# Patient Record
Sex: MALE | Race: WHITE | NOT HISPANIC OR LATINO | Employment: OTHER | URBAN - METROPOLITAN AREA
[De-identification: names, ages, dates, MRNs, and addresses within clinical notes are randomized per-mention and may not be internally consistent; named-entity substitution may affect disease eponyms.]

---

## 2020-12-04 ENCOUNTER — TELEPHONE (OUTPATIENT)
Dept: FAMILY MEDICINE | Facility: CLINIC | Age: 60
End: 2020-12-04

## 2020-12-04 NOTE — TELEPHONE ENCOUNTER
zanaflex 4 1 po bid # 60/zero , fiorcet 1 po q 6 hr prn # 30 /zero. Called to [facility] [phone].

## 2020-12-18 ENCOUNTER — APPOINTMENT (OUTPATIENT)
Dept: URGENT CARE | Facility: CLINIC | Age: 60
End: 2020-12-18
Payer: COMMERCIAL

## 2020-12-18 ENCOUNTER — TELEPHONE (OUTPATIENT)
Dept: FAMILY MEDICINE | Facility: CLINIC | Age: 60
End: 2020-12-18

## 2020-12-18 DIAGNOSIS — R05.9 COUGH: ICD-10-CM

## 2020-12-18 PROCEDURE — U0003 INFECTIOUS AGENT DETECTION BY NUCLEIC ACID (DNA OR RNA); SEVERE ACUTE RESPIRATORY SYNDROME CORONAVIRUS 2 (SARS-COV-2) (CORONAVIRUS DISEASE [COVID-19]), AMPLIFIED PROBE TECHNIQUE, MAKING USE OF HIGH THROUGHPUT TECHNOLOGIES AS DESCRIBED BY CMS-2020-01-R: HCPCS

## 2020-12-20 DIAGNOSIS — U07.1 COVID-19 VIRUS DETECTED: ICD-10-CM

## 2020-12-20 LAB — SARS-COV-2 RNA RESP QL NAA+PROBE: DETECTED

## 2021-01-07 ENCOUNTER — TELEPHONE (OUTPATIENT)
Dept: FAMILY MEDICINE | Facility: CLINIC | Age: 61
End: 2021-01-07

## 2021-04-29 ENCOUNTER — PATIENT MESSAGE (OUTPATIENT)
Dept: RESEARCH | Facility: HOSPITAL | Age: 61
End: 2021-04-29

## 2021-08-03 ENCOUNTER — TELEPHONE (OUTPATIENT)
Dept: FAMILY MEDICINE | Facility: CLINIC | Age: 61
End: 2021-08-03

## 2022-05-15 RX ORDER — TIZANIDINE 4 MG/1
4 TABLET ORAL 2 TIMES DAILY PRN
Qty: 60 TABLET | Refills: 2 | Status: SHIPPED | OUTPATIENT
Start: 2022-05-15 | End: 2023-04-12

## 2022-11-04 ENCOUNTER — OFFICE VISIT (OUTPATIENT)
Dept: FAMILY MEDICINE | Facility: CLINIC | Age: 62
End: 2022-11-04
Payer: COMMERCIAL

## 2022-11-04 VITALS
HEIGHT: 72 IN | BODY MASS INDEX: 27.9 KG/M2 | TEMPERATURE: 98 F | RESPIRATION RATE: 18 BRPM | WEIGHT: 206 LBS | DIASTOLIC BLOOD PRESSURE: 80 MMHG | SYSTOLIC BLOOD PRESSURE: 136 MMHG | OXYGEN SATURATION: 97 % | HEART RATE: 79 BPM

## 2022-11-04 DIAGNOSIS — Z72.0 TOBACCO USE: ICD-10-CM

## 2022-11-04 DIAGNOSIS — Z12.11 COLON CANCER SCREENING: ICD-10-CM

## 2022-11-04 DIAGNOSIS — M54.50 LEFT LOW BACK PAIN, UNSPECIFIED CHRONICITY, UNSPECIFIED WHETHER SCIATICA PRESENT: ICD-10-CM

## 2022-11-04 DIAGNOSIS — Z12.5 SCREENING PSA (PROSTATE SPECIFIC ANTIGEN): ICD-10-CM

## 2022-11-04 DIAGNOSIS — Z13.220 SCREENING CHOLESTEROL LEVEL: ICD-10-CM

## 2022-11-04 DIAGNOSIS — J40 BRONCHITIS: Primary | ICD-10-CM

## 2022-11-04 PROCEDURE — 99214 PR OFFICE/OUTPT VISIT, EST, LEVL IV, 30-39 MIN: ICD-10-PCS | Mod: S$GLB,,, | Performed by: FAMILY MEDICINE

## 2022-11-04 PROCEDURE — 3008F PR BODY MASS INDEX (BMI) DOCUMENTED: ICD-10-PCS | Mod: CPTII,S$GLB,, | Performed by: FAMILY MEDICINE

## 2022-11-04 PROCEDURE — 1159F MED LIST DOCD IN RCRD: CPT | Mod: CPTII,S$GLB,, | Performed by: FAMILY MEDICINE

## 2022-11-04 PROCEDURE — 3079F PR MOST RECENT DIASTOLIC BLOOD PRESSURE 80-89 MM HG: ICD-10-PCS | Mod: CPTII,S$GLB,, | Performed by: FAMILY MEDICINE

## 2022-11-04 PROCEDURE — 1160F RVW MEDS BY RX/DR IN RCRD: CPT | Mod: CPTII,S$GLB,, | Performed by: FAMILY MEDICINE

## 2022-11-04 PROCEDURE — 3079F DIAST BP 80-89 MM HG: CPT | Mod: CPTII,S$GLB,, | Performed by: FAMILY MEDICINE

## 2022-11-04 PROCEDURE — 1159F PR MEDICATION LIST DOCUMENTED IN MEDICAL RECORD: ICD-10-PCS | Mod: CPTII,S$GLB,, | Performed by: FAMILY MEDICINE

## 2022-11-04 PROCEDURE — 3075F PR MOST RECENT SYSTOLIC BLOOD PRESS GE 130-139MM HG: ICD-10-PCS | Mod: CPTII,S$GLB,, | Performed by: FAMILY MEDICINE

## 2022-11-04 PROCEDURE — 99214 OFFICE O/P EST MOD 30 MIN: CPT | Mod: S$GLB,,, | Performed by: FAMILY MEDICINE

## 2022-11-04 PROCEDURE — 1160F PR REVIEW ALL MEDS BY PRESCRIBER/CLIN PHARMACIST DOCUMENTED: ICD-10-PCS | Mod: CPTII,S$GLB,, | Performed by: FAMILY MEDICINE

## 2022-11-04 PROCEDURE — 3075F SYST BP GE 130 - 139MM HG: CPT | Mod: CPTII,S$GLB,, | Performed by: FAMILY MEDICINE

## 2022-11-04 PROCEDURE — 3008F BODY MASS INDEX DOCD: CPT | Mod: CPTII,S$GLB,, | Performed by: FAMILY MEDICINE

## 2022-11-04 RX ORDER — BUTALBITAL, ACETAMINOPHEN AND CAFFEINE 300; 40; 50 MG/1; MG/1; MG/1
CAPSULE ORAL
COMMUNITY

## 2022-11-04 RX ORDER — OXYCODONE AND ACETAMINOPHEN 10; 325 MG/1; MG/1
TABLET ORAL
COMMUNITY

## 2022-11-06 PROBLEM — Z72.0 TOBACCO USE: Status: ACTIVE | Noted: 2022-11-06

## 2022-11-06 RX ORDER — DOXYCYCLINE 100 MG/1
100 CAPSULE ORAL EVERY 12 HOURS
Qty: 20 CAPSULE | Refills: 0 | Status: SHIPPED | OUTPATIENT
Start: 2022-11-06

## 2022-11-07 NOTE — PROGRESS NOTES
Subjective:       Patient ID: Bryson Abdalla is a 62 y.o. male.    Chief Complaint: Cough and Nasal Congestion    Coughing congestion for 3 weeks.  Increase and decrease symptoms.  Worsening again now.  No fever.  Greenish sputum.  Tobacco use chews tobacco.  Back pain also.  Going for acupuncture.  Numbness down to the left shin.  Probable radiculopathy of the left lower extremity.  Also colon cancer screening is due.  Has been 10 years.  Due for labs also.      Physical examination.  Vital signs noted.  No acute distress.  Neck without bruit.  Chest clear.  Heart regular rate and rhythm.  Deep tender reflexes trace in the lower extremities.  Straight leg raising is negative.  Left hip good range of motion without any pain.      Objective:        Assessment:       1. Bronchitis    2. Tobacco use    3. Left low back pain, unspecified chronicity, unspecified whether sciatica present    4. Colon cancer screening    5. Screening cholesterol level    6. Screening PSA (prostate specific antigen)          Plan:       Bronchitis  -     Comprehensive Metabolic Panel; Future; Expected date: 11/18/2022  -     CBC Auto Differential; Future; Expected date: 11/18/2022    Tobacco use    Left low back pain, unspecified chronicity, unspecified whether sciatica present  -     X-Ray Lumbar Spine AP And Lateral; Future; Expected date: 11/04/2022    Colon cancer screening    Screening cholesterol level  -     Lipid Panel; Future; Expected date: 11/18/2022    Screening PSA (prostate specific antigen)  -     PSA, Screening; Future; Expected date: 11/18/2022    Other orders  -     doxycycline (VIBRAMYCIN) 100 MG Cap; Take 1 capsule (100 mg total) by mouth every 12 (twelve) hours.  Dispense: 20 capsule; Refill: 0      Discontinue tobacco use.  Go for colonoscopy.  To Dr. Macario hui.  CBC CMP lipids ordered.  PSA.  Discussed ongoing prostate cancer screening wishes to continue with this.  X-ray lumbar spine.  Vibramycin 100 mg b.i.d. for 10  days.

## 2022-11-08 ENCOUNTER — PATIENT MESSAGE (OUTPATIENT)
Dept: ADMINISTRATIVE | Facility: HOSPITAL | Age: 62
End: 2022-11-08
Payer: COMMERCIAL

## 2022-11-21 ENCOUNTER — HOSPITAL ENCOUNTER (OUTPATIENT)
Dept: RADIOLOGY | Facility: HOSPITAL | Age: 62
Discharge: HOME | End: 2022-11-21
Attending: FAMILY MEDICINE
Payer: COMMERCIAL

## 2022-11-21 ENCOUNTER — LAB VISIT (OUTPATIENT)
Dept: LAB | Facility: HOSPITAL | Age: 62
End: 2022-11-21
Attending: FAMILY MEDICINE
Payer: COMMERCIAL

## 2022-11-21 DIAGNOSIS — M54.50 LEFT LOW BACK PAIN, UNSPECIFIED CHRONICITY, UNSPECIFIED WHETHER SCIATICA PRESENT: ICD-10-CM

## 2022-11-21 DIAGNOSIS — Z13.220 SCREENING CHOLESTEROL LEVEL: ICD-10-CM

## 2022-11-21 DIAGNOSIS — J40 BRONCHITIS: ICD-10-CM

## 2022-11-21 DIAGNOSIS — Z12.5 SCREENING PSA (PROSTATE SPECIFIC ANTIGEN): ICD-10-CM

## 2022-11-21 LAB
ALBUMIN SERPL BCP-MCNC: 4.1 G/DL (ref 3.5–5.2)
ALP SERPL-CCNC: 56 U/L (ref 55–135)
ALT SERPL W/O P-5'-P-CCNC: 29 U/L (ref 10–44)
ANION GAP SERPL CALC-SCNC: 7 MMOL/L (ref 8–16)
AST SERPL-CCNC: 23 U/L (ref 10–40)
BASOPHILS # BLD AUTO: 0.03 K/UL (ref 0–0.2)
BASOPHILS NFR BLD: 0.6 % (ref 0–1.9)
BILIRUB SERPL-MCNC: 1.7 MG/DL (ref 0.1–1)
BUN SERPL-MCNC: 14 MG/DL (ref 8–23)
CALCIUM SERPL-MCNC: 9.1 MG/DL (ref 8.7–10.5)
CHLORIDE SERPL-SCNC: 100 MMOL/L (ref 95–110)
CHOLEST SERPL-MCNC: 186 MG/DL (ref 120–199)
CHOLEST/HDLC SERPL: 3.4 {RATIO} (ref 2–5)
CO2 SERPL-SCNC: 28 MMOL/L (ref 23–29)
COMPLEXED PSA SERPL-MCNC: 0.99 NG/ML (ref 0–4)
CREAT SERPL-MCNC: 0.9 MG/DL (ref 0.5–1.4)
DIFFERENTIAL METHOD: ABNORMAL
EOSINOPHIL # BLD AUTO: 0.1 K/UL (ref 0–0.5)
EOSINOPHIL NFR BLD: 1.3 % (ref 0–8)
ERYTHROCYTE [DISTWIDTH] IN BLOOD BY AUTOMATED COUNT: 12.1 % (ref 11.5–14.5)
EST. GFR  (NO RACE VARIABLE): >60 ML/MIN/1.73 M^2
GLUCOSE SERPL-MCNC: 110 MG/DL (ref 70–110)
HCT VFR BLD AUTO: 45.7 % (ref 40–54)
HDLC SERPL-MCNC: 54 MG/DL (ref 40–75)
HDLC SERPL: 29 % (ref 20–50)
HGB BLD-MCNC: 15.5 G/DL (ref 14–18)
IMM GRANULOCYTES # BLD AUTO: 0.01 K/UL (ref 0–0.04)
IMM GRANULOCYTES NFR BLD AUTO: 0.2 % (ref 0–0.5)
LDLC SERPL CALC-MCNC: 119.8 MG/DL (ref 63–159)
LYMPHOCYTES # BLD AUTO: 1.8 K/UL (ref 1–4.8)
LYMPHOCYTES NFR BLD: 34.5 % (ref 18–48)
MCH RBC QN AUTO: 31.9 PG (ref 27–31)
MCHC RBC AUTO-ENTMCNC: 33.9 G/DL (ref 32–36)
MCV RBC AUTO: 94 FL (ref 82–98)
MONOCYTES # BLD AUTO: 0.7 K/UL (ref 0.3–1)
MONOCYTES NFR BLD: 12.5 % (ref 4–15)
NEUTROPHILS # BLD AUTO: 2.7 K/UL (ref 1.8–7.7)
NEUTROPHILS NFR BLD: 50.9 % (ref 38–73)
NONHDLC SERPL-MCNC: 132 MG/DL
NRBC BLD-RTO: 0 /100 WBC
PLATELET # BLD AUTO: 211 K/UL (ref 150–450)
PMV BLD AUTO: 9.5 FL (ref 9.2–12.9)
POTASSIUM SERPL-SCNC: 4.2 MMOL/L (ref 3.5–5.1)
PROT SERPL-MCNC: 7.9 G/DL (ref 6–8.4)
RBC # BLD AUTO: 4.86 M/UL (ref 4.6–6.2)
SODIUM SERPL-SCNC: 135 MMOL/L (ref 136–145)
TRIGL SERPL-MCNC: 61 MG/DL (ref 30–150)
WBC # BLD AUTO: 5.21 K/UL (ref 3.9–12.7)

## 2022-11-21 PROCEDURE — 85025 COMPLETE CBC W/AUTO DIFF WBC: CPT | Performed by: FAMILY MEDICINE

## 2022-11-21 PROCEDURE — 84153 ASSAY OF PSA TOTAL: CPT | Performed by: FAMILY MEDICINE

## 2022-11-21 PROCEDURE — 36415 COLL VENOUS BLD VENIPUNCTURE: CPT | Performed by: FAMILY MEDICINE

## 2022-11-21 PROCEDURE — 80061 LIPID PANEL: CPT | Performed by: FAMILY MEDICINE

## 2022-11-21 PROCEDURE — 72100 X-RAY EXAM L-S SPINE 2/3 VWS: CPT | Mod: TC

## 2022-11-21 PROCEDURE — 80053 COMPREHEN METABOLIC PANEL: CPT | Performed by: FAMILY MEDICINE

## 2022-12-15 LAB — CRC RECOMMENDATION EXT: NORMAL

## 2022-12-22 ENCOUNTER — PATIENT OUTREACH (OUTPATIENT)
Dept: ADMINISTRATIVE | Facility: HOSPITAL | Age: 62
End: 2022-12-22
Payer: COMMERCIAL

## 2024-07-09 ENCOUNTER — PATIENT MESSAGE (OUTPATIENT)
Dept: ADMINISTRATIVE | Facility: HOSPITAL | Age: 64
End: 2024-07-09
Payer: COMMERCIAL

## 2024-09-18 ENCOUNTER — OFFICE VISIT (OUTPATIENT)
Dept: FAMILY MEDICINE | Facility: CLINIC | Age: 64
End: 2024-09-18
Payer: COMMERCIAL

## 2024-09-18 VITALS
SYSTOLIC BLOOD PRESSURE: 132 MMHG | DIASTOLIC BLOOD PRESSURE: 74 MMHG | BODY MASS INDEX: 27.99 KG/M2 | OXYGEN SATURATION: 99 % | HEIGHT: 72 IN | TEMPERATURE: 98 F | WEIGHT: 206.69 LBS | HEART RATE: 62 BPM

## 2024-09-18 DIAGNOSIS — Z00.00 PHYSICAL EXAM: Primary | ICD-10-CM

## 2024-09-18 DIAGNOSIS — Z23 NEED FOR TDAP VACCINATION: ICD-10-CM

## 2024-09-18 DIAGNOSIS — Z98.890 S/P ACL REPAIR: ICD-10-CM

## 2024-09-18 DIAGNOSIS — M50.90 CERVICAL DISC DISEASE: ICD-10-CM

## 2024-09-18 DIAGNOSIS — Z72.0 TOBACCO USE: ICD-10-CM

## 2024-09-18 DIAGNOSIS — Z12.5 SCREENING PSA (PROSTATE SPECIFIC ANTIGEN): ICD-10-CM

## 2024-09-18 PROCEDURE — 99396 PREV VISIT EST AGE 40-64: CPT | Mod: 25,S$GLB,, | Performed by: FAMILY MEDICINE

## 2024-09-18 PROCEDURE — 90715 TDAP VACCINE 7 YRS/> IM: CPT | Mod: S$GLB,,, | Performed by: FAMILY MEDICINE

## 2024-09-18 PROCEDURE — 1160F RVW MEDS BY RX/DR IN RCRD: CPT | Mod: CPTII,S$GLB,, | Performed by: FAMILY MEDICINE

## 2024-09-18 PROCEDURE — 99999 PR PBB SHADOW E&M-EST. PATIENT-LVL III: CPT | Mod: PBBFAC,,, | Performed by: FAMILY MEDICINE

## 2024-09-18 PROCEDURE — 3044F HG A1C LEVEL LT 7.0%: CPT | Mod: CPTII,S$GLB,, | Performed by: FAMILY MEDICINE

## 2024-09-18 PROCEDURE — 3008F BODY MASS INDEX DOCD: CPT | Mod: CPTII,S$GLB,, | Performed by: FAMILY MEDICINE

## 2024-09-18 PROCEDURE — 1159F MED LIST DOCD IN RCRD: CPT | Mod: CPTII,S$GLB,, | Performed by: FAMILY MEDICINE

## 2024-09-18 PROCEDURE — 90471 IMMUNIZATION ADMIN: CPT | Mod: S$GLB,,, | Performed by: FAMILY MEDICINE

## 2024-09-18 PROCEDURE — 3075F SYST BP GE 130 - 139MM HG: CPT | Mod: CPTII,S$GLB,, | Performed by: FAMILY MEDICINE

## 2024-09-18 PROCEDURE — 99213 OFFICE O/P EST LOW 20 MIN: CPT | Mod: 25,S$GLB,, | Performed by: FAMILY MEDICINE

## 2024-09-18 PROCEDURE — 3078F DIAST BP <80 MM HG: CPT | Mod: CPTII,S$GLB,, | Performed by: FAMILY MEDICINE

## 2024-09-18 RX ORDER — TIZANIDINE 4 MG/1
4 TABLET ORAL 2 TIMES DAILY PRN
Qty: 60 TABLET | Refills: 2 | Status: SHIPPED | OUTPATIENT
Start: 2024-09-18

## 2024-09-18 RX ORDER — IBUPROFEN 800 MG/1
800 TABLET ORAL 3 TIMES DAILY
Qty: 30 TABLET | Refills: 0 | Status: SHIPPED | OUTPATIENT
Start: 2024-09-18

## 2024-09-18 RX ORDER — HYDROCODONE BITARTRATE AND ACETAMINOPHEN 5; 325 MG/1; MG/1
1 TABLET ORAL EVERY 6 HOURS PRN
Qty: 28 TABLET | Refills: 0 | Status: SHIPPED | OUTPATIENT
Start: 2024-09-18

## 2024-09-19 ENCOUNTER — LAB VISIT (OUTPATIENT)
Dept: LAB | Facility: HOSPITAL | Age: 64
End: 2024-09-19
Attending: FAMILY MEDICINE
Payer: COMMERCIAL

## 2024-09-19 DIAGNOSIS — Z12.5 SCREENING PSA (PROSTATE SPECIFIC ANTIGEN): ICD-10-CM

## 2024-09-19 DIAGNOSIS — Z00.00 PHYSICAL EXAM: ICD-10-CM

## 2024-09-19 PROBLEM — Z98.890 S/P ACL REPAIR: Status: ACTIVE | Noted: 2024-09-19

## 2024-09-19 LAB
ALBUMIN SERPL BCP-MCNC: 4.6 G/DL (ref 3.5–5.2)
ALP SERPL-CCNC: 49 U/L (ref 55–135)
ALT SERPL W/O P-5'-P-CCNC: 18 U/L (ref 10–44)
ANION GAP SERPL CALC-SCNC: 5 MMOL/L (ref 8–16)
AST SERPL-CCNC: 18 U/L (ref 10–40)
BILIRUB SERPL-MCNC: 1 MG/DL (ref 0.1–1)
BUN SERPL-MCNC: 12 MG/DL (ref 8–23)
CALCIUM SERPL-MCNC: 9.3 MG/DL (ref 8.7–10.5)
CHLORIDE SERPL-SCNC: 104 MMOL/L (ref 95–110)
CHOLEST SERPL-MCNC: 188 MG/DL (ref 120–199)
CHOLEST/HDLC SERPL: 3.2 {RATIO} (ref 2–5)
CO2 SERPL-SCNC: 29 MMOL/L (ref 23–29)
COMPLEXED PSA SERPL-MCNC: 0.34 NG/ML (ref 0–4)
CREAT SERPL-MCNC: 0.8 MG/DL (ref 0.5–1.4)
ERYTHROCYTE [DISTWIDTH] IN BLOOD BY AUTOMATED COUNT: 12.6 % (ref 11.5–14.5)
EST. GFR  (NO RACE VARIABLE): >60 ML/MIN/1.73 M^2
ESTIMATED AVG GLUCOSE: 117 MG/DL (ref 68–131)
GLUCOSE SERPL-MCNC: 97 MG/DL (ref 70–110)
HBA1C MFR BLD: 5.7 % (ref 4.5–6.2)
HCT VFR BLD AUTO: 45.4 % (ref 40–54)
HDLC SERPL-MCNC: 59 MG/DL (ref 40–75)
HDLC SERPL: 31.4 % (ref 20–50)
HGB BLD-MCNC: 15.5 G/DL (ref 14–18)
LDLC SERPL CALC-MCNC: 114.6 MG/DL (ref 63–159)
MCH RBC QN AUTO: 32 PG (ref 27–31)
MCHC RBC AUTO-ENTMCNC: 34.1 G/DL (ref 32–36)
MCV RBC AUTO: 94 FL (ref 82–98)
NONHDLC SERPL-MCNC: 129 MG/DL
PLATELET # BLD AUTO: 200 K/UL (ref 150–450)
PMV BLD AUTO: 10.2 FL (ref 9.2–12.9)
POTASSIUM SERPL-SCNC: 4 MMOL/L (ref 3.5–5.1)
PROT SERPL-MCNC: 7.2 G/DL (ref 6–8.4)
RBC # BLD AUTO: 4.84 M/UL (ref 4.6–6.2)
SODIUM SERPL-SCNC: 138 MMOL/L (ref 136–145)
TRIGL SERPL-MCNC: 72 MG/DL (ref 30–150)
WBC # BLD AUTO: 5 K/UL (ref 3.9–12.7)

## 2024-09-19 PROCEDURE — 85027 COMPLETE CBC AUTOMATED: CPT | Performed by: FAMILY MEDICINE

## 2024-09-19 PROCEDURE — 83036 HEMOGLOBIN GLYCOSYLATED A1C: CPT | Performed by: FAMILY MEDICINE

## 2024-09-19 PROCEDURE — 80053 COMPREHEN METABOLIC PANEL: CPT | Performed by: FAMILY MEDICINE

## 2024-09-19 PROCEDURE — 86803 HEPATITIS C AB TEST: CPT | Performed by: FAMILY MEDICINE

## 2024-09-19 PROCEDURE — 80061 LIPID PANEL: CPT | Performed by: FAMILY MEDICINE

## 2024-09-19 PROCEDURE — 84153 ASSAY OF PSA TOTAL: CPT | Performed by: FAMILY MEDICINE

## 2024-09-19 PROCEDURE — 36415 COLL VENOUS BLD VENIPUNCTURE: CPT | Performed by: FAMILY MEDICINE

## 2024-09-20 LAB — HCV AB S/CO SERPL IA: NON REACTIVE

## 2024-09-20 NOTE — PROGRESS NOTES
Subjective:       Patient ID: Bryson Abdalla is a 64 y.o. male.    Chief Complaint: Annual Exam    Here for routine physical.    Social history chews tobacco.  No significant alcohol in take.  Works as a contractor    Family history no changes.    Past medical history.  Chewing tobacco use.  Continues to use this.  Status post left ACL repair.  Cervical disc disease currently doing okay.  BMI of 28..      Review of Systems   Constitutional: Negative.    HENT: Negative.     Eyes: Negative.    Respiratory: Negative.     Cardiovascular: Negative.    Gastrointestinal: Negative.    Endocrine: Negative.    Genitourinary: Negative.    Musculoskeletal: Negative.         Left 3rd finger painful.  Had mucinous cyst removed yesterday.  Throbbing.   Skin: Negative.    Neurological: Negative.    Hematological: Negative.    Psychiatric/Behavioral: Negative.         Objective:      Physical Exam  Vitals reviewed.   Constitutional:       Appearance: Normal appearance. He is well-developed and normal weight.   HENT:      Head: Normocephalic and atraumatic.      Right Ear: Tympanic membrane normal.      Left Ear: Tympanic membrane normal.      Nose: Nose normal.      Mouth/Throat:      Mouth: Mucous membranes are moist.      Pharynx: No oropharyngeal exudate.   Eyes:      Conjunctiva/sclera: Conjunctivae normal.      Pupils: Pupils are equal, round, and reactive to light.   Neck:      Vascular: No carotid bruit.   Cardiovascular:      Rate and Rhythm: Normal rate and regular rhythm.      Pulses: Normal pulses.      Heart sounds: Normal heart sounds. No murmur heard.     No gallop.   Pulmonary:      Effort: Pulmonary effort is normal.      Breath sounds: Normal breath sounds.   Abdominal:      General: Bowel sounds are normal.      Palpations: Abdomen is soft. There is no mass.      Tenderness: There is no abdominal tenderness.   Musculoskeletal:         General: Normal range of motion.      Cervical back: Normal range of motion.    Skin:     General: Skin is warm and dry.      Comments: Dressing left 3rd finger over PIP joint.   Neurological:      General: No focal deficit present.      Mental Status: He is alert and oriented to person, place, and time.   Psychiatric:         Mood and Affect: Mood normal.         Behavior: Behavior normal.         Assessment:       1. Physical exam    2. Tobacco use    3. BMI 28.0-28.9,adult    4. S/P ACL repair    5. Cervical disc disease    6. Screening PSA (prostate specific antigen)    7. Need for Tdap vaccination        Plan:       Physical exam  -     CBC Without Differential; Future; Expected date: 09/18/2024  -     COMPREHENSIVE METABOLIC PANEL; Future; Expected date: 09/18/2024  -     LIPID PANEL; Future; Expected date: 09/18/2024  -     HEMOGLOBIN A1C; Future; Expected date: 09/18/2024  -     HEPATITIS C ANTIBODY; Future; Expected date: 09/18/2024    Tobacco use    BMI 28.0-28.9,adult    S/P ACL repair    Cervical disc disease    Screening PSA (prostate specific antigen)  -     PSA, SCREENING; Future; Expected date: 09/18/2024    Need for Tdap vaccination  -     Tdap (BOOSTRIX) vaccine injection 0.5 mL    Other orders  -     tiZANidine (ZANAFLEX) 4 MG tablet; Take 1 tablet (4 mg total) by mouth 2 (two) times daily as needed (cervical pain).  Dispense: 60 tablet; Refill: 2  -     HYDROcodone-acetaminophen (NORCO) 5-325 mg per tablet; Take 1 tablet by mouth every 6 (six) hours as needed for Pain.  Dispense: 28 tablet; Refill: 0  -     ibuprofen (ADVIL,MOTRIN) 800 MG tablet; Take 1 tablet (800 mg total) by mouth 3 (three) times daily.  Dispense: 30 tablet; Refill: 0      TD AP today.  Declines flu shot.  Declines shingles vaccine.  CBC CMP lipids A1c PSA hepatitis-C antibody.  Zanaflex 4 mg up to b.i.d. p.r.n. for neck pain.    In addition to routine physical.  Had mucinous cyst removed from left 3rd finger yesterday.  Very painful.  Was given a pain medication by orthopedist.      Physical  examination.  Left 3rd finger bulky dressing in place.  Neurovascular intact.    Impression.  Status post removal of mucinous cyst from left 3rd finger.    Plan hydrocodone acetaminophen 5/325 q.6 hours p.r.n. pain.  Twenty-eight pills.  Ibuprofen 800 mg t.i.d. p.r.n. 30 pills.   check done.

## 2024-12-03 DIAGNOSIS — Z00.00 ROUTINE GENERAL MEDICAL EXAMINATION AT A HEALTH CARE FACILITY: Primary | ICD-10-CM

## 2025-01-09 ENCOUNTER — CLINICAL SUPPORT (OUTPATIENT)
Dept: INTERNAL MEDICINE | Facility: CLINIC | Age: 65
End: 2025-01-09
Payer: COMMERCIAL

## 2025-01-09 ENCOUNTER — HOSPITAL ENCOUNTER (OUTPATIENT)
Dept: RADIOLOGY | Facility: HOSPITAL | Age: 65
Discharge: HOME | End: 2025-01-09
Attending: FAMILY MEDICINE

## 2025-01-09 ENCOUNTER — HOSPITAL ENCOUNTER (OUTPATIENT)
Dept: CARDIOLOGY | Facility: HOSPITAL | Age: 65
Discharge: HOME | End: 2025-01-09
Attending: FAMILY MEDICINE

## 2025-01-09 ENCOUNTER — CLINICAL SUPPORT (OUTPATIENT)
Dept: PULMONOLOGY | Facility: CLINIC | Age: 65
End: 2025-01-09

## 2025-01-09 ENCOUNTER — OFFICE VISIT (OUTPATIENT)
Dept: INTERNAL MEDICINE | Facility: CLINIC | Age: 65
End: 2025-01-09
Payer: COMMERCIAL

## 2025-01-09 ENCOUNTER — CLINICAL SUPPORT (OUTPATIENT)
Dept: AUDIOLOGY | Facility: CLINIC | Age: 65
End: 2025-01-09

## 2025-01-09 VITALS
DIASTOLIC BLOOD PRESSURE: 82 MMHG | WEIGHT: 198 LBS | HEIGHT: 70 IN | TEMPERATURE: 97 F | BODY MASS INDEX: 28.35 KG/M2 | SYSTOLIC BLOOD PRESSURE: 144 MMHG | HEART RATE: 65 BPM

## 2025-01-09 DIAGNOSIS — Z00.00 ROUTINE GENERAL MEDICAL EXAMINATION AT A HEALTH CARE FACILITY: Primary | ICD-10-CM

## 2025-01-09 DIAGNOSIS — Z01.12 HEARING CONSERVATION AND TREATMENT EXAM: Primary | ICD-10-CM

## 2025-01-09 DIAGNOSIS — Z00.00 ROUTINE GENERAL MEDICAL EXAMINATION AT A HEALTH CARE FACILITY: ICD-10-CM

## 2025-01-09 DIAGNOSIS — R03.0 ELEVATED BLOOD PRESSURE READING WITHOUT DIAGNOSIS OF HYPERTENSION: ICD-10-CM

## 2025-01-09 DIAGNOSIS — Z12.9 CANCER SCREENING: Primary | ICD-10-CM

## 2025-01-09 PROBLEM — Z98.890 S/P ACL REPAIR: Status: RESOLVED | Noted: 2024-09-19 | Resolved: 2025-01-09

## 2025-01-09 LAB
ALBUMIN SERPL BCP-MCNC: 5 G/DL (ref 3.5–5.2)
ALP SERPL-CCNC: 70 U/L (ref 40–150)
ALT SERPL W/O P-5'-P-CCNC: 32 U/L (ref 10–44)
ANION GAP SERPL CALC-SCNC: 13 MMOL/L (ref 8–16)
AST SERPL-CCNC: 29 U/L (ref 10–40)
BILIRUB SERPL-MCNC: 0.9 MG/DL (ref 0.1–1)
BILIRUB UR QL STRIP: NEGATIVE
BRPFT: NORMAL
BUN SERPL-MCNC: 13 MG/DL (ref 8–23)
CALCIUM SERPL-MCNC: 10 MG/DL (ref 8.7–10.5)
CHLORIDE SERPL-SCNC: 101 MMOL/L (ref 95–110)
CHOLEST SERPL-MCNC: 246 MG/DL (ref 120–199)
CHOLEST/HDLC SERPL: 3.5 {RATIO} (ref 2–5)
CLARITY UR: CLEAR
CO2 SERPL-SCNC: 24 MMOL/L (ref 23–29)
COLOR UR: YELLOW
COMPLEXED PSA SERPL-MCNC: 0.28 NG/ML (ref 0–4)
CREAT SERPL-MCNC: 1.2 MG/DL (ref 0.5–1.4)
ERYTHROCYTE [DISTWIDTH] IN BLOOD BY AUTOMATED COUNT: 12.4 % (ref 11.5–14.5)
EST. GFR  (NO RACE VARIABLE): >60 ML/MIN/1.73 M^2
ESTIMATED AVG GLUCOSE: 108 MG/DL (ref 68–131)
FEF 25 75 LLN: 1.83
FEF 25 75 PRE REF: 110.6 %
FEF 25 75 REF: 3.54
FEV1 FVC LLN: 64
FEV1 FVC PRE REF: 106.6 %
FEV1 FVC REF: 77
FEV1 LLN: 2.74
FEV1 PRE REF: 100.7 %
FEV1 REF: 3.68
FVC LLN: 3.65
FVC PRE REF: 94.1 %
FVC REF: 4.83
GLUCOSE SERPL-MCNC: 109 MG/DL (ref 70–110)
GLUCOSE UR QL STRIP: NEGATIVE
HBA1C MFR BLD: 5.4 % (ref 4–5.6)
HCT VFR BLD AUTO: 49 % (ref 40–54)
HDLC SERPL-MCNC: 71 MG/DL (ref 40–75)
HDLC SERPL: 28.9 % (ref 20–50)
HGB BLD-MCNC: 17 G/DL (ref 14–18)
HGB UR QL STRIP: NEGATIVE
HIV 1+2 AB+HIV1 P24 AG SERPL QL IA: NORMAL
KETONES UR QL STRIP: NEGATIVE
LDLC SERPL CALC-MCNC: 159 MG/DL (ref 63–159)
LEUKOCYTE ESTERASE UR QL STRIP: NEGATIVE
MCH RBC QN AUTO: 32.2 PG (ref 27–31)
MCHC RBC AUTO-ENTMCNC: 34.7 G/DL (ref 32–36)
MCV RBC AUTO: 93 FL (ref 82–98)
NITRITE UR QL STRIP: NEGATIVE
NONHDLC SERPL-MCNC: 175 MG/DL
OHS QRS DURATION: 94 MS
OHS QTC CALCULATION: 399 MS
PEF LLN: 7
PEF PRE REF: 120.5 %
PEF REF: 9.45
PH UR STRIP: 7 [PH] (ref 5–8)
PLATELET # BLD AUTO: 174 K/UL (ref 150–450)
PMV BLD AUTO: 10.8 FL (ref 9.2–12.9)
POTASSIUM SERPL-SCNC: 3.8 MMOL/L (ref 3.5–5.1)
PRE FEF 25 75: 3.91 L/S (ref 1.83–5.25)
PRE FET 100: 10.03 SEC
PRE FEV1 FVC: 81.69 % (ref 64.22–87.46)
PRE FEV1: 3.71 L (ref 2.74–4.57)
PRE FVC: 4.54 L (ref 3.65–6.02)
PRE PEF: 11.38 L/S (ref 7–11.9)
PROT SERPL-MCNC: 9 G/DL (ref 6–8.4)
PROT UR QL STRIP: NEGATIVE
RBC # BLD AUTO: 5.28 M/UL (ref 4.6–6.2)
SODIUM SERPL-SCNC: 138 MMOL/L (ref 136–145)
SP GR UR STRIP: 1.01 (ref 1–1.03)
TRIGL SERPL-MCNC: 80 MG/DL (ref 30–150)
TSH SERPL DL<=0.005 MIU/L-ACNC: 1.27 UIU/ML (ref 0.4–4)
URN SPEC COLLECT METH UR: NORMAL
WBC # BLD AUTO: 5.58 K/UL (ref 3.9–12.7)

## 2025-01-09 PROCEDURE — 80061 LIPID PANEL: CPT | Performed by: FAMILY MEDICINE

## 2025-01-09 PROCEDURE — 71046 X-RAY EXAM CHEST 2 VIEWS: CPT | Mod: TC

## 2025-01-09 PROCEDURE — 83655 ASSAY OF LEAD: CPT | Performed by: FAMILY MEDICINE

## 2025-01-09 PROCEDURE — 93005 ELECTROCARDIOGRAM TRACING: CPT

## 2025-01-09 PROCEDURE — 84153 ASSAY OF PSA TOTAL: CPT | Performed by: FAMILY MEDICINE

## 2025-01-09 PROCEDURE — 93010 ELECTROCARDIOGRAM REPORT: CPT | Mod: ,,, | Performed by: INTERNAL MEDICINE

## 2025-01-09 PROCEDURE — 81479 UNLISTED MOLECULAR PATHOLOGY: CPT | Mod: WS | Performed by: FAMILY MEDICINE

## 2025-01-09 PROCEDURE — 84443 ASSAY THYROID STIM HORMONE: CPT | Performed by: FAMILY MEDICINE

## 2025-01-09 PROCEDURE — 83036 HEMOGLOBIN GLYCOSYLATED A1C: CPT | Performed by: FAMILY MEDICINE

## 2025-01-09 PROCEDURE — 87389 HIV-1 AG W/HIV-1&-2 AB AG IA: CPT | Performed by: FAMILY MEDICINE

## 2025-01-09 PROCEDURE — 71046 X-RAY EXAM CHEST 2 VIEWS: CPT | Mod: 26,,, | Performed by: RADIOLOGY

## 2025-01-09 PROCEDURE — 99999 PR PBB SHADOW E&M-EST. PATIENT-LVL III: CPT | Mod: PBBFAC,,, | Performed by: FAMILY MEDICINE

## 2025-01-09 PROCEDURE — 80053 COMPREHEN METABOLIC PANEL: CPT | Performed by: FAMILY MEDICINE

## 2025-01-09 PROCEDURE — 99999 PR PBB SHADOW E&M-EST. PATIENT-LVL I: CPT | Mod: PBBFAC,,, | Performed by: AUDIOLOGIST

## 2025-01-09 PROCEDURE — 85027 COMPLETE CBC AUTOMATED: CPT | Performed by: FAMILY MEDICINE

## 2025-01-09 PROCEDURE — 81003 URINALYSIS AUTO W/O SCOPE: CPT | Performed by: FAMILY MEDICINE

## 2025-01-09 NOTE — LETTER
2025    Bryson Abdalla  [address] [facility] [address]             [address]  [address]  [place] [address]  Phone: [phone]  Fax: [phone] Dear Mr. Abdalla:    On 2025, it was a pleasure to meet you and perform your Executive Health evaluation. Your Primary Care physician is Dr. Librado Aguayo. At the time of this visit, you are 64 years old. You denied any specific complaints or concerns during today's visit.      You do not have ANY SIGNIFICANT PAST MEDICAL HISTORY.      YOUR PAST SURGICAL HISTORY includes a ganglion cyst excision from the left middle finger, a surgical reattachment of the left 2nd finger, bilateral inguinal hernia repairs, a strabismus eye surgery, and a left ACL repair..    YOUR CURRENT MEDICATIONS include Ibuprofen 800 mg 3 times a day as needed for pain .    You do not have ANY KNOWN DRUG ALLERGIES.    YOUR SOCIAL HISTORY includes residential from the [employer] [facility] [employer]. You currently chew/dip tobacco. You drink alcohol infrequently. You denied any illicit drug use..    YOUR FAMILY HISTORY includes your father, , with hypertension. Your mother, , with lung cancer. No known colon or prostate cancer.    YOUR ROUTINE HEALTH MAINTENANCE includes a tetanus booster in 2024, no recent flu vaccines, no previous COVID vaccines, no previous shingles vaccines, no previous pneumonia vaccines, and a colonoscopy in 2022.  .    PHYSICAL EXAMINATION: You are 5 ft 10 in tall, weighing 198 pounds with a body mass index of 28. This places you in the Overweight category. Your initial blood pressure was 163/79. Your repeat blood pressure reading was 144/82.  Both of these readings are elevated. Your heart rate is 65 beats per minute. All of these are normal values. Other than your elevated blood pressure readings, your physical examination did not reveal any additional significant abnormal  findings.      YOUR BLOOD WORK AND ADDITIONAL TESTS: Reveal normal white cell counts, red cell counts, and platelet levels. You are not Anemic. Your glucose, kidney, liver, and electrolyte tests are normal. Your total cholesterol is 246. Your triglycerides are 80. Your HDL is 71. Your LDL is 159.  Based on these numbers, your 10-year risk of heart attack or stroke is 12.5%. Your hemoglobin A1c is 5.4%, which is in a normal range. You do not have Prediabetes or Diabetes. Your TSH is 1.274, which is in a normal range. You do not have an active Thyroid problem.  Your urinalysis is normal. Your serum PSA is 0.28, which is in a normal range. You do not have any suspicions for Prostate cancer. Your HIV test is negative. Your serum lead level is  normal.     Your EKG shows a Normal sinus rhythm with no acute or chronic abnormalities present.    Your CXR is normal with no acute or chronic cardiopulmonary abnormality present..    Your Pulmonary function test testing shows normal spirometry.    Your Audiology exam revealed a moderate hearing loss 2188-0818 Hz for the right ear, and  mild-to-moderate hearing loss 5157-3723 Hz for the left ear.     In summary, you are overall in good health. Multiple blood pressure readings were elevated in clinic today.  I recommend you discontinue any current nicotine use. You should also check your blood pressure daily on a rotating basis. If your average average blood pressure is greater than 140/90 and/or any readings are frequently greater than 160/100, you will need to start antihypertensive medication. If your average blood pressure is less than 140/90, you may remain off any medication at this time. Your 10-year risk of heart attack or stroke is elevated at 12.5%.  This is primarily based on your uncontrolled blood pressure readings today. As long as you control your blood pressure, daily aspirin or cholesterol medications are not recommended.    Based on the [place] Preventive  Task Force recommendations, you should receive a tetanus booster every 10 years.  ou should receive a flu vaccine yearly. You should complete the primary COVID vaccine series.  You should complete the shingles vaccine series.  You should obtain a pneumonia vaccine.  You are due for repeat colon cancer screening in December 2032.     It was a pleasure to meet you and perform this Executive Health evaluation. If I can be of any further assistance, or if you have any additional questions or concerns, please do not hesitate to let me know.    Sincerely,      Nicola Huynh MD, FAAFP

## 2025-01-09 NOTE — PROGRESS NOTES
Executive Health Screening    Bryson Abdalla was seen 01/09/2025 for an executive health hearing screen.      Otoscopy was unremarkable in both ears. Results revealed a moderate hearing loss 6505-8396 Hz for the right ear, and  mild-to-moderate hearing loss 0671-5159 Hz for the left ear.     Patient was counseled on the above findings.    Recommendations:   Consistent use of hearing protective devices when around loud noise.   Annual hearing screenings.

## 2025-01-09 NOTE — PROGRESS NOTES
"Nutrition Assessment  Session Time:  30 minutes      Client name:  Bryson Abdalla  :  1960  Age:  64 y.o.  Gender:  male    Client states:  Present for nutrition counseling as part of his annual Executive Health physical.  Retired from [employer].  No prior nutrition consultation.    No questions or concerns today.    Does not participate in intentional physical activity but stays active throughout the day. Does mendez work and also get plenty of walking when out hunting       Meals: protein and coffee with honey for breakfast  + 1 meal (dinner)   At times may have small snack such as meat, cheese, nut container in middle of day if energy level is low.   Tends to choose seafood instead of meat sources.  Includes plenty of vegetables and beans in diet.  Limited fruit intake.    Alcohol: daily, 1-4 beers  Drinks: water (throughout day and night)  Fast food: 0    Anthropometrics  Height:  70"     Weight:  198 lbs  BMI:  28.41  % Body Fat:  n/a    Clinical Signs/Symptoms  N/V/D:  none noted  Appetite:  good       Past Medical History:   Diagnosis Date    Hepatitis     Born with it       Past Surgical History:   Procedure Laterality Date    EYE MUSCLE SURGERY  Age 3    Floating eye    Finger re-attatched      HERNIA REPAIR      JOINT REPLACEMENT      3       Medications    has a current medication list which includes the following prescription(s): butalbital-acetaminophen-caff, doxycycline, hydrocodone-acetaminophen, ibuprofen, oxycodone-acetaminophen, phrenilin forte, tizanidine, and tizanidine.    Vitamins, Minerals, and/or Supplements:  not discussed     Food/Medication Interactions:  Reviewed     Food Allergies or Intolerances:  NKFA noted in chart     Social History    Marital status:    Employment:  retired    Social History     Tobacco Use    Smoking status: Former    Smokeless tobacco: Current    Tobacco comments:     Dip Tobacco   ; Smoked at age 16.   Substance Use Topics    " Alcohol use: Yes     Comment: very rarely        Lab Reports   Sodium   Date Value Ref Range Status   09/19/2024 138 136 - 145 mmol/L Final   12/27/2017 135 134 - 144 mmol/L      Potassium   Date Value Ref Range Status   09/19/2024 4.0 3.5 - 5.1 mmol/L Final     Chloride   Date Value Ref Range Status   09/19/2024 104 95 - 110 mmol/L Final   12/27/2017 99 98 - 110 mmol/L      CO2   Date Value Ref Range Status   09/19/2024 29 23 - 29 mmol/L Final     Glucose   Date Value Ref Range Status   09/19/2024 97 70 - 110 mg/dL Final   12/27/2017 120 (H) 70 - 99 mg/dL      BUN   Date Value Ref Range Status   09/19/2024 12 8 - 23 mg/dL Final     Creatinine   Date Value Ref Range Status   09/19/2024 0.8 0.5 - 1.4 mg/dL Final   12/27/2017 1.12 0.60 - 1.40 mg/dL      Calcium   Date Value Ref Range Status   09/19/2024 9.3 8.7 - 10.5 mg/dL Final     Total Protein   Date Value Ref Range Status   09/19/2024 7.2 6.0 - 8.4 g/dL Final     Albumin   Date Value Ref Range Status   09/19/2024 4.6 3.5 - 5.2 g/dL Final   12/27/2017 4.5 3.1 - 4.7 g/dL      Total Bilirubin   Date Value Ref Range Status   09/19/2024 1.0 0.1 - 1.0 mg/dL Final     Comment:     For infants and newborns, interpretation of results should be based  on gestational age, weight and in agreement with clinical  observations.    Premature Infant recommended reference ranges:  Up to 24 hours.............<8.0 mg/dL  Up to 48 hours............<12.0 mg/dL  3-5 days..................<15.0 mg/dL  6-29 days.................<15.0 mg/dL       Alkaline Phosphatase   Date Value Ref Range Status   09/19/2024 49 (L) 55 - 135 U/L Final     AST   Date Value Ref Range Status   09/19/2024 18 10 - 40 U/L Final     ALT   Date Value Ref Range Status   09/19/2024 18 10 - 44 U/L Final     Anion Gap   Date Value Ref Range Status   09/19/2024 5 (L) 8 - 16 mmol/L Final     eGFR if    Date Value Ref Range Status   12/07/2017 105 > OR = 60 mL/min/1.73m2 Final     eGFR if non African  American   Date Value Ref Range Status   12/07/2017 90 > OR = 60 mL/min/1.73m2 Final      Lab Results   Component Value Date    WBC 5.00 09/19/2024    HGB 15.5 09/19/2024    HCT 45.4 09/19/2024    MCV 94 09/19/2024     09/19/2024        Lab Results   Component Value Date    CHOL 188 09/19/2024     Lab Results   Component Value Date    HDL 59 09/19/2024     Lab Results   Component Value Date    LDLCALC 114.6 09/19/2024     Lab Results   Component Value Date    TRIG 72 09/19/2024     Lab Results   Component Value Date    CHOLHDL 31.4 09/19/2024     Lab Results   Component Value Date    HGBA1C 5.7 09/19/2024     BP Readings from Last 1 Encounters:   09/18/24 132/74       Food History  reviewed    Exercise History:  reviewed    Cultural/Spiritual/Personal Preferences:  None identified    Support System:  spouse    State of Change:  Contemplation    Barriers to Change:  none    Diagnosis    No nutrition diagnosis at this time    Intervention    RMR (Method:  [place] St. Jeor):  1705 kcal  Activity Factor:  1.3    COLE:  2216 kcal    Goals:  1.  Aim for 30 grams dietary fiber per day.       Nutrition Education  The following education was provided to the patient:  Discussed Heart Healthy Eating.  Discussed nutrition-related lab values and dietary and/or lifestyle factors affecting them.  Discussed [place] client resources (may include but not limited to Eat Fit Shopping List, Fueling Well on the Go, and Meal Planning Guide).    Patient verbalized understanding of nutrition education and recommendations received.    Handouts Provided, sent via email  Meal Planning Guide  Eat Fit Shopping List  Fueling Well On-The-Go  Balanced Plate    Monitoring/Evaluation    Monitor the following:  Weight  BMI  Caloric intake  Labs:  lipid panel, HgbA1c    Follow Up Plan:  Communication with referring healthcare provider is unnecessary at this time as patient presented as part of annual wellness exam.  However, will follow up with  patient in 1-2 years.

## 2025-01-09 NOTE — PROGRESS NOTES
"Subjective:   Patient ID: Bryson Abdalla is a 64 y.o. male.  Chief Complaint:  Executive Health    Presents for executive health evaluation   PCP Dr. Librado Aguayo  Denies any significant past medical history   Past surgical history for a ganglion cyst excision from the left middle finger, a surgical reattachment of the left 2nd finger, bilateral inguinal hernia repairs, a strabismus eye surgery, and a left ACL repair.  Current medications include ibuprofen 800 mg 3 times a day as needed for pain   No known drug allergies   Social history includes [facility] from the [employer].  Choose/dips tobacco.  No current alcohol use.  Denies any illicit drug use.    Family history includes father, , hypertension.  Mother, , lung cancer.  No known colon or prostate cancer.  Health maintenance includes a tetanus booster in 2024, no recent flu vaccines, no previous COVID vaccines, no previous shingles vaccines, no previous pneumonia vaccines, and a colonoscopy in 2022.    No specific complaints or concerns today    Review of Systems   Musculoskeletal:  Positive for arthralgias.     Objective:   BP (!) 144/82 (BP Location: Right arm, Patient Position: Sitting)   Pulse 65   Temp 97.1 °F (36.2 °C)   Ht 5' 10" (1.778 m)   Wt 89.8 kg (198 lb)   BMI 28.41 kg/m²     Physical Exam  Vitals and nursing note reviewed.   Constitutional:       Appearance: Normal appearance. He is well-developed and overweight.      Comments:   Blood pressure elevated on multiple readings   HENT:      Right Ear: Tympanic membrane and ear canal normal.      Left Ear: Tympanic membrane and ear canal normal.      Nose: No congestion or rhinorrhea.      Mouth/Throat:      Pharynx: Oropharynx is clear. Uvula midline. No pharyngeal swelling, oropharyngeal exudate or posterior oropharyngeal erythema.   Neck:      Thyroid: No thyroid mass, thyromegaly or thyroid tenderness.   Cardiovascular:      Rate and " Rhythm: Normal rate and regular rhythm.      Heart sounds: Normal heart sounds.   Pulmonary:      Effort: Pulmonary effort is normal.      Breath sounds: Normal breath sounds.   Abdominal:      General: There is no distension.      Palpations: Abdomen is soft.      Tenderness: There is no abdominal tenderness.   Skin:     Findings: No rash.   Psychiatric:         Mood and Affect: Mood and affect normal.     CBC and CMP normal   Lipid panel pending  A1c and TSH pending   Urinalysis normal   PSA pending   Lead level pending   HIV pending     Chest x-ray pending     Prelim pulmonary function testing normal     EKG normal sinus rhythm, normal EKG    Audiology exam results revealed a moderate hearing loss 0514-1642 Hz for the right ear, and  mild-to-moderate hearing loss 0300-3899 Hz for the left ear.     Assessment:       ICD-10-CM ICD-9-CM   1. Routine general medical examination at a health care facility  Z00.00 V70.0   2. Elevated blood pressure reading without diagnosis of hypertension  R03.0 796.2     Plan:   Routine general medical examination at a health care facility  Blood pressure elevated.  BMI 28.    Send full executive Health letter when all test resulted.    Colon cancer screening up-to-date   Prostate cancer screening today   Tetanus booster up-to-date   Declines all other recommended vaccines     Elevated blood pressure reading without diagnosis of hypertension  Patient education/handout on diet and lifestyle changes to help promote better blood pressure control  Check blood pressure daily on a rotating basis  If average blood pressure greater than 140/90 and/or any readings greater than 160/100, will need to start antihypertensive medication   If average blood pressure less than 140/90, okay to remain off medications    Follow up with PCP as scheduled/as needed     Return to clinic 1 year for executive Health evaluation

## 2025-01-11 LAB
CITY: NORMAL
COUNTY: NORMAL
GUARDIAN FIRST NAME: NORMAL
GUARDIAN LAST NAME: NORMAL
LEAD BLD-MCNC: 2.7 MCG/DL
PHONE #: NORMAL
POSTAL CODE: NORMAL
RACE: NORMAL
STATE OF RESIDENCE: NORMAL
STREET ADDRESS: NORMAL

## 2025-06-26 ENCOUNTER — RESULTS FOLLOW-UP (OUTPATIENT)
Dept: FAMILY MEDICINE | Facility: CLINIC | Age: 65
End: 2025-06-26

## 2025-06-26 ENCOUNTER — HOSPITAL ENCOUNTER (OUTPATIENT)
Dept: RADIOLOGY | Facility: HOSPITAL | Age: 65
Discharge: HOME | End: 2025-06-26
Attending: FAMILY MEDICINE
Payer: COMMERCIAL

## 2025-06-26 ENCOUNTER — OFFICE VISIT (OUTPATIENT)
Dept: FAMILY MEDICINE | Facility: CLINIC | Age: 65
End: 2025-06-26
Payer: COMMERCIAL

## 2025-06-26 DIAGNOSIS — M76.60 PAIN IN ACHILLES TENDON: ICD-10-CM

## 2025-06-26 DIAGNOSIS — J40 BRONCHITIS: ICD-10-CM

## 2025-06-26 DIAGNOSIS — M76.60 PAIN IN ACHILLES TENDON: Primary | ICD-10-CM

## 2025-06-26 LAB
CTP QC/QA: YES
CTP QC/QA: YES
POC MOLECULAR INFLUENZA A AGN: NEGATIVE
POC MOLECULAR INFLUENZA B AGN: NEGATIVE
SARS-COV-2 RDRP RESP QL NAA+PROBE: NEGATIVE

## 2025-06-26 PROCEDURE — 73650 X-RAY EXAM OF HEEL: CPT | Mod: TC,RT

## 2025-06-26 PROCEDURE — 73650 X-RAY EXAM OF HEEL: CPT | Mod: 26,RT,, | Performed by: RADIOLOGY

## 2025-06-26 PROCEDURE — 99999 PR PBB SHADOW E&M-EST. PATIENT-LVL III: CPT | Mod: PBBFAC,,, | Performed by: FAMILY MEDICINE

## 2025-06-26 RX ORDER — GUAIFENESIN AND PSEUDOEPHEDRINE HCL 600; 60 MG/1; MG/1
1 TABLET, EXTENDED RELEASE ORAL
COMMUNITY

## 2025-06-26 RX ORDER — AZITHROMYCIN 250 MG/1
TABLET, FILM COATED ORAL
Qty: 6 TABLET | Refills: 0 | Status: SHIPPED | OUTPATIENT
Start: 2025-06-26

## 2025-06-27 VITALS
WEIGHT: 201 LBS | BODY MASS INDEX: 28.77 KG/M2 | SYSTOLIC BLOOD PRESSURE: 134 MMHG | DIASTOLIC BLOOD PRESSURE: 80 MMHG | OXYGEN SATURATION: 98 % | TEMPERATURE: 98 F | HEART RATE: 62 BPM | HEIGHT: 70 IN

## 2025-06-28 NOTE — PROGRESS NOTES
Subjective:       Patient ID: Bryson Abdalla is a 64 y.o. male.    Chief Complaint: Cough and Nasal Congestion    6 in yesterday evening.  Coughing no fever.  No sore throat but very congested.  Flu and COVID testing are negative.  Also right Achilles pain for 4 months.  Did some lifting.  Sore with each step painful.    Physical examination.  Vital signs noted.  Neck without bruit.  Chest clear.  Heart regular rate and rhythm.  Extremities without edema.  Right Achilles slightly tender at insertion.  No swelling.        Objective:        Assessment:       1. Pain in Achilles tendon    2. Bronchitis    3. BMI 28.0-28.9,adult        Plan:       Pain in Achilles tendon  -     X-Ray Calcaneus 2 View Right; Future; Expected date: 06/26/2025    Bronchitis  -     POCT COVID-19 Rapid Screening  -     POCT Influenza A/B Molecular    BMI 28.0-28.9,adult    Other orders  -     azithromycin (Z-NARGIS) 250 MG tablet; Take 2 tablets by mouth on day 1; Take 1 tablet by mouth on days 2-5  Dispense: 6 tablet; Refill: 0      Zithromax Z-Nargis.  X-ray the right heel.  Delsym p.r.n. for cough.  Avoid heavy loads on the right Achilles.

## 2025-06-30 ENCOUNTER — TELEPHONE (OUTPATIENT)
Dept: FAMILY MEDICINE | Facility: CLINIC | Age: 65
End: 2025-06-30
Payer: COMMERCIAL

## 2025-06-30 NOTE — TELEPHONE ENCOUNTER
----- Message from Librado Aguayo MD sent at 6/26/2025  9:34 PM CDT -----  NORMAL followup as needed.  ----- Message -----  From: Interface, Rad Results In  Sent: 6/26/2025  12:50 PM CDT  To: Librado Aguayo III, MD